# Patient Record
Sex: FEMALE | Race: WHITE | NOT HISPANIC OR LATINO | Employment: FULL TIME | URBAN - METROPOLITAN AREA
[De-identification: names, ages, dates, MRNs, and addresses within clinical notes are randomized per-mention and may not be internally consistent; named-entity substitution may affect disease eponyms.]

---

## 2021-01-28 ENCOUNTER — HOSPITAL ENCOUNTER (EMERGENCY)
Facility: HOSPITAL | Age: 51
Discharge: HOME | End: 2021-01-28
Attending: EMERGENCY MEDICINE | Admitting: EMERGENCY MEDICINE

## 2021-01-28 ENCOUNTER — APPOINTMENT (OUTPATIENT)
Dept: MRI IMAGING | Facility: HOSPITAL | Age: 51
End: 2021-01-28

## 2021-01-28 VITALS
OXYGEN SATURATION: 96 % | WEIGHT: 165 LBS | BODY MASS INDEX: 25.9 KG/M2 | HEART RATE: 97 BPM | DIASTOLIC BLOOD PRESSURE: 79 MMHG | SYSTOLIC BLOOD PRESSURE: 133 MMHG | RESPIRATION RATE: 16 BRPM | TEMPERATURE: 97 F | HEIGHT: 67 IN

## 2021-01-28 DIAGNOSIS — M54.16 LUMBAR RADICULOPATHY, ACUTE: ICD-10-CM

## 2021-01-28 DIAGNOSIS — M51.36 DDD (DEGENERATIVE DISC DISEASE), LUMBAR: Primary | ICD-10-CM

## 2021-01-28 LAB
B-HCG UR QL: NEGATIVE
BILIRUB UR QL STRIP: NEGATIVE
CLARITY UR: CLEAR
COLOR UR: YELLOW
GLUCOSE UR STRIP-MCNC: NEGATIVE MG/DL
HGB UR QL STRIP.AUTO: NEGATIVE
INTERNAL NEGATIVE CONTROL: NEGATIVE
INTERNAL POSITIVE CONTROL: POSITIVE
KETONES UR QL STRIP: ABNORMAL
LEUKOCYTE ESTERASE UR QL STRIP.AUTO: NEGATIVE
Lab: NORMAL
NITRITE UR QL STRIP: NEGATIVE
PH UR STRIP.AUTO: <=5 [PH] (ref 5–8)
PROT UR QL STRIP: NEGATIVE
SP GR UR STRIP: 1.02 (ref 1–1.03)
UROBILINOGEN UR QL STRIP: ABNORMAL

## 2021-01-28 PROCEDURE — 63710000001 PREDNISONE PER 1 MG: Performed by: EMERGENCY MEDICINE

## 2021-01-28 PROCEDURE — 99283 EMERGENCY DEPT VISIT LOW MDM: CPT

## 2021-01-28 PROCEDURE — 81025 URINE PREGNANCY TEST: CPT | Performed by: EMERGENCY MEDICINE

## 2021-01-28 PROCEDURE — 72148 MRI LUMBAR SPINE W/O DYE: CPT

## 2021-01-28 PROCEDURE — 81003 URINALYSIS AUTO W/O SCOPE: CPT | Performed by: EMERGENCY MEDICINE

## 2021-01-28 RX ORDER — NAPROXEN 500 MG/1
500 TABLET ORAL 2 TIMES DAILY PRN
Qty: 12 TABLET | Refills: 0 | OUTPATIENT
Start: 2021-01-28 | End: 2021-04-16

## 2021-01-28 RX ORDER — CYCLOBENZAPRINE HCL 10 MG
10 TABLET ORAL 3 TIMES DAILY PRN
Qty: 12 TABLET | Refills: 0 | OUTPATIENT
Start: 2021-01-28 | End: 2021-04-16

## 2021-01-28 RX ORDER — HYDROCODONE BITARTRATE AND ACETAMINOPHEN 5; 325 MG/1; MG/1
1-2 TABLET ORAL EVERY 6 HOURS PRN
Qty: 15 TABLET | Refills: 0 | OUTPATIENT
Start: 2021-01-28 | End: 2021-04-16

## 2021-01-28 RX ORDER — PREDNISONE 20 MG/1
60 TABLET ORAL ONCE
Status: COMPLETED | OUTPATIENT
Start: 2021-01-28 | End: 2021-01-28

## 2021-01-28 RX ORDER — NAPROXEN 250 MG/1
500 TABLET ORAL ONCE
Status: COMPLETED | OUTPATIENT
Start: 2021-01-28 | End: 2021-01-28

## 2021-01-28 RX ORDER — PREDNISONE 50 MG/1
50 TABLET ORAL DAILY
Qty: 5 TABLET | Refills: 0 | OUTPATIENT
Start: 2021-01-28 | End: 2021-04-16

## 2021-01-28 RX ADMIN — NAPROXEN 500 MG: 250 TABLET ORAL at 07:56

## 2021-01-28 RX ADMIN — PREDNISONE 60 MG: 20 TABLET ORAL at 07:56

## 2021-04-15 PROCEDURE — 99283 EMERGENCY DEPT VISIT LOW MDM: CPT

## 2021-04-16 ENCOUNTER — HOSPITAL ENCOUNTER (EMERGENCY)
Facility: HOSPITAL | Age: 51
Discharge: HOME | End: 2021-04-16
Attending: EMERGENCY MEDICINE | Admitting: EMERGENCY MEDICINE

## 2021-04-16 VITALS
HEART RATE: 87 BPM | BODY MASS INDEX: 26.37 KG/M2 | TEMPERATURE: 97.8 F | WEIGHT: 168 LBS | OXYGEN SATURATION: 98 % | HEIGHT: 67 IN | DIASTOLIC BLOOD PRESSURE: 54 MMHG | RESPIRATION RATE: 18 BRPM | SYSTOLIC BLOOD PRESSURE: 109 MMHG

## 2021-04-16 DIAGNOSIS — M54.31 SCIATICA OF RIGHT SIDE: Primary | ICD-10-CM

## 2021-04-16 PROCEDURE — 96372 THER/PROPH/DIAG INJ SC/IM: CPT

## 2021-04-16 PROCEDURE — 25010000002 KETOROLAC TROMETHAMINE PER 15 MG: Performed by: EMERGENCY MEDICINE

## 2021-04-16 RX ORDER — DIAZEPAM 5 MG/1
10 TABLET ORAL ONCE
Status: COMPLETED | OUTPATIENT
Start: 2021-04-16 | End: 2021-04-16

## 2021-04-16 RX ORDER — CYCLOBENZAPRINE HCL 10 MG
10 TABLET ORAL 3 TIMES DAILY PRN
Qty: 15 TABLET | Refills: 0 | Status: SHIPPED | OUTPATIENT
Start: 2021-04-16 | End: 2021-06-09

## 2021-04-16 RX ORDER — FLUOXETINE HYDROCHLORIDE 20 MG/1
20 CAPSULE ORAL DAILY
COMMUNITY

## 2021-04-16 RX ORDER — METHYLPREDNISOLONE 4 MG/1
TABLET ORAL
Qty: 21 TABLET | Refills: 0 | Status: SHIPPED | OUTPATIENT
Start: 2021-04-16 | End: 2021-06-09 | Stop reason: SDUPTHER

## 2021-04-16 RX ORDER — VENLAFAXINE HYDROCHLORIDE 75 MG/1
75 CAPSULE, EXTENDED RELEASE ORAL 2 TIMES DAILY
COMMUNITY

## 2021-04-16 RX ORDER — HYDROCODONE BITARTRATE AND ACETAMINOPHEN 5; 325 MG/1; MG/1
1 TABLET ORAL ONCE
Status: COMPLETED | OUTPATIENT
Start: 2021-04-16 | End: 2021-04-16

## 2021-04-16 RX ORDER — NAPROXEN 500 MG/1
500 TABLET ORAL 2 TIMES DAILY PRN
Qty: 10 TABLET | Refills: 0 | Status: SHIPPED | OUTPATIENT
Start: 2021-04-16 | End: 2021-06-09

## 2021-04-16 RX ORDER — CLONAZEPAM 0.5 MG/1
0.5 TABLET ORAL 2 TIMES DAILY PRN
COMMUNITY

## 2021-04-16 RX ORDER — HYDROCODONE BITARTRATE AND ACETAMINOPHEN 5; 325 MG/1; MG/1
1 TABLET ORAL EVERY 6 HOURS PRN
Qty: 12 TABLET | Refills: 0 | Status: SHIPPED | OUTPATIENT
Start: 2021-04-16 | End: 2021-06-09

## 2021-04-16 RX ORDER — KETOROLAC TROMETHAMINE 30 MG/ML
30 INJECTION, SOLUTION INTRAMUSCULAR; INTRAVENOUS ONCE
Status: COMPLETED | OUTPATIENT
Start: 2021-04-16 | End: 2021-04-16

## 2021-04-16 RX ADMIN — HYDROCODONE BITARTRATE AND ACETAMINOPHEN 1 TABLET: 5; 325 TABLET ORAL at 00:57

## 2021-04-16 RX ADMIN — DIAZEPAM 10 MG: 5 TABLET ORAL at 00:57

## 2021-04-16 RX ADMIN — KETOROLAC TROMETHAMINE 30 MG: 30 INJECTION, SOLUTION INTRAMUSCULAR; INTRAVENOUS at 01:00

## 2021-04-16 NOTE — ED PROVIDER NOTES
Subjective   Nati Phelps is a 50 yr old female presents the emergency department for complaints of low back pain.  Patient reports that she had an MRI done on January 28.  She advised that she had a herniated, bulging disc.  Patient reports that she has not followed up regarding these imaging results.  She advises that she has moved some boxes recently and has really aggravated her back.  She denies any loss of bowel or bladder function.  Negative for any saddle anesthesia.  Patient reports that the pains off the charts at this time.      History provided by:  Patient   used: No    Back Pain  Location:  Lumbar spine  Quality:  Stabbing and burning  Radiates to:  R posterior upper leg  Pain severity:  Moderate  Relieved by:  Nothing  Worsened by:  Bending, twisting, palpation, sitting and movement  Associated symptoms: no bladder incontinence, no bowel incontinence, no chest pain, no fever, no numbness, no paresthesias, no tingling and no weakness        Review of Systems   Constitutional: Negative for chills and fever.   Respiratory: Negative for cough and shortness of breath.    Cardiovascular: Negative for chest pain.   Gastrointestinal: Negative for bowel incontinence, nausea and vomiting.   Genitourinary: Negative for bladder incontinence.   Musculoskeletal: Positive for back pain.   Neurological: Negative for tingling, weakness, numbness and paresthesias.   All other systems reviewed and are negative.      Past Medical History:   Diagnosis Date   • Injury of back        Allergies   Allergen Reactions   • Sulfa Antibiotics Swelling       Past Surgical History:   Procedure Laterality Date   • BREAST SURGERY      reduction   • HYSTERECTOMY      partial       History reviewed. No pertinent family history.    Social History     Socioeconomic History   • Marital status: Single     Spouse name: Not on file   • Number of children: Not on file   • Years of education: Not on file   • Highest  education level: Not on file   Tobacco Use   • Smoking status: Never Smoker   • Smokeless tobacco: Never Used   Substance and Sexual Activity   • Alcohol use: Never   • Drug use: Yes     Types: Marijuana   • Sexual activity: Not Currently           Objective   Physical Exam  Vitals and nursing note reviewed.   Constitutional:       Appearance: Normal appearance. She is well-developed. She is not toxic-appearing.      Comments: Patient appears uncomfortable with movements.   HENT:      Head: Normocephalic and atraumatic.      Nose: Nose normal.      Mouth/Throat:      Mouth: Mucous membranes are moist.   Eyes:      General: Lids are normal.      Extraocular Movements: Extraocular movements intact.      Conjunctiva/sclera: Conjunctivae normal.      Pupils: Pupils are equal, round, and reactive to light.   Neck:      Trachea: Trachea normal.   Cardiovascular:      Rate and Rhythm: Regular rhythm.      Pulses: Normal pulses.      Heart sounds: Normal heart sounds.   Pulmonary:      Effort: Pulmonary effort is normal. No respiratory distress.      Breath sounds: Normal breath sounds. No decreased breath sounds, wheezing, rhonchi or rales.   Abdominal:      General: Bowel sounds are normal.      Palpations: Abdomen is soft.      Tenderness: There is no abdominal tenderness.   Musculoskeletal:      Cervical back: Normal, full passive range of motion without pain and normal range of motion.      Thoracic back: Normal.      Lumbar back: Tenderness (paraspinal tenderness, Rt > LT, RT SI joint tenderness) present. No swelling or edema. Normal range of motion.   Skin:     General: Skin is warm and dry.      Findings: No rash.   Neurological:      Mental Status: She is alert and oriented to person, place, and time.      Cranial Nerves: No cranial nerve deficit.   Psychiatric:         Speech: Speech normal.         Behavior: Behavior normal. Behavior is cooperative.         Procedures           ED Course  ED Course as of Apr 16  "0207   Fri Apr 16, 2021 0207 The patient will be discharged home.  Patient to follow-up with PCP, neurosurgery.  Patient to take meds as ordered.  Patient to return to the ED as needed.  Patient agrees with treatment plan and verbalized understanding.    [KG]      ED Course User Index  [KG] Betsy Douglass C, APRN           No results found for this or any previous visit (from the past 24 hour(s)).  Note: In addition to lab results from this visit, the labs listed above may include labs taken at another facility or during a different encounter within the last 24 hours. Please correlate lab times with ED admission and discharge times for further clarification of the services performed during this visit.    No orders to display     Vitals:    04/15/21 2338   BP: 140/86   BP Location: Right arm   Patient Position: Sitting   Pulse: 91   Resp: 18   Temp: 97.8 °F (36.6 °C)   TempSrc: Oral   SpO2: 98%   Weight: 76.2 kg (168 lb)   Height: 170.2 cm (67\")     Medications   ketorolac (TORADOL) injection 30 mg (30 mg Intramuscular Given 4/16/21 0100)   diazePAM (VALIUM) tablet 10 mg (10 mg Oral Given 4/16/21 0057)   HYDROcodone-acetaminophen (NORCO) 5-325 MG per tablet 1 tablet (1 tablet Oral Given 4/16/21 0057)     ECG/EMG Results (last 24 hours)     ** No results found for the last 24 hours. **        No orders to display                                       MDM    Final diagnoses:   Sciatica of right side       ED Disposition  ED Disposition     ED Disposition Condition Comment    Discharge Stable           Fernando Davis PA-C  [address] [place], [place] [address] [phone]               Medication List      New Prescriptions    methylPREDNISolone 4 MG dose pack  Commonly known as: MEDROL  Take as directed on package instructions.        Changed    HYDROcodone-acetaminophen 5-325 MG per tablet  Commonly known as: NORCO  Take 1 tablet by mouth Every 6 (Six) Hours As Needed for Moderate Pain .  What " changed:   · how much to take  · reasons to take this        Stop    predniSONE 50 MG tablet  Commonly known as: DELTASONE           Where to Get Your Medications      These medications were sent to [facility]/pharmacy #[id number] - [place], [place] - [address] [phone]  - [phone]   [address] [address]    Hours: 24-hours Phone: [phone]   · cyclobenzaprine 10 MG tablet  · methylPREDNISolone 4 MG dose pack  · naproxen 500 MG EC tablet     You can get these medications from any pharmacy    Bring a paper prescription for each of these medications  · HYDROcodone-acetaminophen 5-325 MG per tablet          Betsy Douglass, APRN  04/16/21 0207

## 2021-04-16 NOTE — DISCHARGE INSTRUCTIONS
Follow up with one of the [facility] Primary Care Providers below to setup primary care. If you need assistance coordinating a primary care appointment with a [facility] Primary Care Provider, please contact the Primary Care Coordinators at [phone] for appointment scheduling.    [facility], Primary Care   [address] , [address]   [place], [place] [address] [phone]    [facility] Internal Medicine & Endocrinology  [address], [address]  [place] [address] [phone]    [facility] Family [facility]  [address], [address]   [place] [address] [phone]    [facility]  [address], [address]  [place], [place] [address] [phone]    [facility], Primary Care,   [address], [address]   [place], [place] [address] [phone]    [facility]  [address]  [place], [place] [address] [phone]    [facility], Primary Care  [address], [address]  [place], [place] [address] [phone]    [facility] Internal Medicine & Pediatrics  [address], [address]   [place] [address] [phone]    [facility], Primary Care  [address] [place] [address] [phone]      [facility]  [address], [address]   [place] [address] [phone]    [facility]  [address] [place] [address] [phone]

## 2021-06-09 ENCOUNTER — OFFICE VISIT (OUTPATIENT)
Dept: FAMILY MEDICINE CLINIC | Facility: CLINIC | Age: 51
End: 2021-06-09

## 2021-06-09 VITALS
OXYGEN SATURATION: 99 % | HEART RATE: 76 BPM | DIASTOLIC BLOOD PRESSURE: 70 MMHG | HEIGHT: 67 IN | TEMPERATURE: 98.2 F | SYSTOLIC BLOOD PRESSURE: 110 MMHG | WEIGHT: 170 LBS | BODY MASS INDEX: 26.68 KG/M2

## 2021-06-09 DIAGNOSIS — M54.42 CHRONIC BILATERAL LOW BACK PAIN WITH BILATERAL SCIATICA: Primary | ICD-10-CM

## 2021-06-09 DIAGNOSIS — G89.29 CHRONIC BILATERAL LOW BACK PAIN WITH BILATERAL SCIATICA: Primary | ICD-10-CM

## 2021-06-09 DIAGNOSIS — Z85.41 HISTORY OF CERVICAL CANCER: ICD-10-CM

## 2021-06-09 DIAGNOSIS — M51.36 DDD (DEGENERATIVE DISC DISEASE), LUMBAR: ICD-10-CM

## 2021-06-09 DIAGNOSIS — M54.41 CHRONIC BILATERAL LOW BACK PAIN WITH BILATERAL SCIATICA: Primary | ICD-10-CM

## 2021-06-09 PROCEDURE — 99204 OFFICE O/P NEW MOD 45 MIN: CPT | Performed by: PHYSICIAN ASSISTANT

## 2021-06-09 RX ORDER — CYCLOBENZAPRINE HCL 10 MG
10 TABLET ORAL 3 TIMES DAILY PRN
Qty: 30 TABLET | Refills: 1 | Status: SHIPPED | OUTPATIENT
Start: 2021-06-09

## 2021-06-09 RX ORDER — METHYLPREDNISOLONE 4 MG/1
TABLET ORAL
Qty: 21 TABLET | Refills: 0 | Status: SHIPPED | OUTPATIENT
Start: 2021-06-09 | End: 2021-07-19
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
## 2021-06-09 NOTE — PATIENT INSTRUCTIONS
-Take Tylenol 2 extra strength tablets twice daily  -Call your psychiatrist to see if he would be willing to change effexor to cymbalta to help with pain    Degenerative Disk Disease    Degenerative disk disease is a condition caused by changes that occur in the spinal disks as a person ages. Spinal disks are soft and compressible disks located between the bones of your spine (vertebrae). These disks act like shock absorbers.  Degenerative disk disease can affect the whole spine. However, the neck and lower back are most often affected. Many changes can occur in the spinal disks with aging, such as:  · The spinal disks may dry and shrink.  · Small tears may occur in the tough, outer covering of the disk (annulus).  · The disk space may become smaller due to loss of water.  · Abnormal growths in the bone (spurs) may occur. This can put pressure on the nerve roots exiting the spinal canal, causing pain.  · The spinal canal may become narrowed.  What are the causes?  This condition may be caused by:  · Normal degeneration with age.  · Injuries.  · Certain activities and sports that cause damage.  What increases the risk?  The following factors may make you more likely to develop this condition:  · Being overweight.  · Having a family history of degenerative disk disease.  · Smoking.  · Sudden injury.  · Doing work that requires heavy lifting.  What are the signs or symptoms?  Symptoms of this condition include:  · Pain that varies in intensity. Some people have no pain, while others have severe pain. The location of the pain depends on the part of your backbone that is affected. You may have:  ? Pain in your neck or arm if a disk in your neck area is affected.  ? Pain in your back, buttocks, or legs if a disk in your lower back is affected.  · Pain that becomes worse while bending or reaching up, or with twisting movements.  · Pain that may start gradually and then get worse as time passes. It may also start after a  major or minor injury.  · Numbness or tingling in the arms or legs.  How is this diagnosed?  This condition may be diagnosed based on:  · Your symptoms and medical history.  · A physical exam.  · Imaging tests, including:  ? An X-ray of the spine.  ? MRI.  How is this treated?  This condition may be treated with:  · Medicines.  · Rehabilitation exercises. These activities aim to strengthen muscles in your back and abdomen to better support your spine.  If treatments do not help to relieve your symptoms or you have severe pain, you may need surgery.  Follow these instructions at home:  Medicines  · Take over-the-counter and prescription medicines only as told by your health care provider.  · Do not drive or use heavy machinery while taking prescription pain medicine.  · If you are taking prescription pain medicine, take actions to prevent or treat constipation. Your health care provider may recommend that you:  ? Drink enough fluid to keep your urine pale yellow.  ? Eat foods that are high in fiber, such as fresh fruits and vegetables, whole grains, and beans.  ? Limit foods that are high in fat and processed sugars, such as fried or sweet foods.  ? Take an over-the-counter or prescription medicine for constipation.  Activity  · Rest as told by your health care provider.  · Ask your health care provider what activities are safe for you. Return to your normal activities as directed.  · Avoid sitting for a long time without moving. Get up to take short walks every 1-2 hours. This is important to improve blood flow and breathing. Ask for help if you feel weak or unsteady.  · Perform relaxation exercises as told by your health care provider.  · Maintain good posture.  · Do not lift anything that is heavier than 10 lb (4.5 kg), or the limit that you are told, until your health care provider says that it is safe.  · Follow proper lifting and walking techniques as told by your health care provider.  Managing pain,  stiffness, and swelling    · If directed, put ice on the painful area. Icing can help to relieve pain.  ? Put ice in a plastic bag.  ? Place a towel between your skin and the bag.  ? Leave the ice on for 20 minutes, 2-3 times a day.  · If directed, apply heat to the painful area as often as told by your health care provider. Heat can reduce the stiffness of your muscles. Use the heat source that your health care provider recommends, such as a moist heat pack or a heating pad.  ? Place a towel between your skin and the heat source.  ? Leave the heat on for 20-30 minutes.  ? Remove the heat if your skin turns bright red. This is especially important if you are unable to feel pain, heat, or cold. You may have a greater risk of getting burned.  General instructions  · Change your sitting, standing, and sleeping habits as told by your health care provider.  · Avoid sitting in the same position for long periods of time. Change positions frequently.  · Lose weight or maintain a healthy weight as told by your health care provider.  · Do not use any products that contain nicotine or tobacco, such as cigarettes and e-cigarettes. If you need help quitting, ask your health care provider.  · Wear supportive footwear.  · Keep all follow-up visits as told by your health care provider. This is important. This may include visits for physical therapy.  Contact a health care provider if you:  · Have pain that does not go away within 1-4 weeks.  · Lose your appetite.  · Lose weight without trying.  Get help right away if you:  · Have severe pain.  · Notice weakness in your arms, hands, or legs.  · Begin to lose control of your bladder or bowel movements.  · Have fevers or night sweats.  Summary  · Degenerative disk disease is a condition caused by changes that occur in the spinal disks as a person ages.  · Degenerative disk disease can affect the whole spine. However, the neck and lower back are most often affected.  · Take  over-the-counter and prescription medicines only as told by your health care provider.  This information is not intended to replace advice given to you by your health care provider. Make sure you discuss any questions you have with your health care provider.  Document Revised: 12/13/2018 Document Reviewed: 12/13/2018  [employer] Patient Education © 2021 [employer]    Managing Pain Without Opioids  Opioids are strong medicines used to treat moderate to severe pain. For some people, especially those who have long-term (chronic) pain, opioids may not be the best choice for pain management due to:  · Side effects like nausea, constipation, and sleepiness.  · The risk of addiction (opioid use disorder). The longer you take opioids, the greater your risk of addiction.  Pain that lasts for more than 3 months is called chronic pain. Managing chronic pain usually requires more than one approach and is often provided by a team of health care providers working together (multidisciplinary approach). Pain management may be done at a pain management center or pain clinic.  Types of pain management without opioids  Managing pain without opioids can involve:  · Non-opioid medicines.  · Exercises to help relieve pain and improve strength and range of motion (physical therapy).  · Therapy to help with everyday tasks and activities (occupational therapy).  · Therapy to help you find ways to relieve pain by doing things you enjoy (recreational therapy).  · Talk therapy (psychotherapy) and other mental health therapies.  · Medical treatments such as injections or devices.  · Making lifestyle changes.  Pain management options  Non-opioid medicines  Non-opioid medicines for pain may include medicines taken by mouth (oral medicines), such as:  · Over-the-counter or prescription NSAIDs. These may be the first medicines used for pain. They work well for muscle and bone pain, and they reduce swelling.  · Acetaminophen. This over-the-counter  medicine may work well for milder pain but not swelling.  · Antidepressants. These may be used to treat chronic pain. A certain type of antidepressant (tricyclics) is often used. These medicines are given in lower doses for pain than when used for depression.  · Anticonvulsants. These are usually used to treat seizures but may also reduce nerve (neuropathic) pain.  · Muscle relaxants. These relieve pain caused by sudden muscle tightening (spasms).  You may also use a type of pain medicine that is applied to the skin as a patch, cream, or gel (topical analgesic), such as a numbing medicine. These may cause fewer side effects than oral medicines.  Therapy  Physical therapy involves doing exercises to gain strength and flexibility. A physical therapist may teach you exercises to move and stretch parts of your body that are weak, stiff, or painful. You can learn these exercises at physical therapy visits and practice them at home. Physical therapy may also involve:  · Massage.  · Heat wraps or applying heat or cold to affected areas.  · Sending electrical signals through the skin to interrupt pain signals (transcutaneous electrical nerve stimulation, TENS).  · Sending weak lasers through the skin to reduce pain and swelling (low-level laser therapy).  · Using signals from your body to help you learn to regulate pain (biofeedback).  Occupational therapy helps you learn ways to function at home and work with less pain.  Recreational therapy may involve trying new activities or hobbies, such as drawing or a physical activity.  Types of mental health therapy for pain include:  · Cognitive behavioral therapy (CBT) to help you learn coping skills for dealing with pain.  · Acceptance and commitment therapy (ACT) to change the way you think and react to pain.  · Relaxation therapies, including muscle relaxation exercises and focusing your mind on the present moment to lower stress (mindfulness-based stress reduction).  · Pain  management counseling. This may be individual, family, or group counseling.    Medical treatments  Medical treatments for pain management include:  · Nerve block injections. These may include a pain blocker and anti-inflammatory medicines. You may have injections:  ? Near the spine to relieve chronic back or neck pain.  ? Into joints to relieve back or joint pain.  ? Into nerve areas that supply a painful area to relieve body pain.  ? Into muscles (trigger point injections) to relieve some painful muscle conditions.  · A medical device placed near your spine to help block pain signals and relieve nerve pain or chronic back pain (spinal cord stimulation device).  · Acupuncture.  Follow these instructions at home  Medicines  · Take over-the-counter and prescription medicines only as told by your health care provider.  · If you are taking pain medicine, ask your health care providers about possible side effects to watch out for.  · Do not drive or use heavy machinery while taking prescription pain medicine.  Lifestyle    · Do not use drugs or alcohol to reduce pain. Limit alcohol intake to no more than 1 drink a day for nonpregnant women and 2 drinks a day for men. One drink equals 12 oz of beer, 5 oz of wine, or 1½ oz of hard liquor.  · Do not use any products that contain nicotine or tobacco, such as cigarettes and e-cigarettes. These can delay healing. If you need help quitting, ask your health care provider.  · Eat a healthy diet and maintain a healthy weight. Poor diet and excess weight may make pain worse.  ? Eat foods that are high in fiber. These include fresh fruits and vegetables, whole grains, and beans.  ? Limit foods that are high in fat and processed sugars, such as fried and sweet foods.  · Exercise regularly. Exercise lowers stress and may help relieve pain.  ? Ask your health care provider what activities and exercises are safe for you.  ? If your health care provider approves, join an exercise class  that combines movement and stress reduction. Examples include yoga and [place].  · Get enough sleep. Lack of sleep may make pain worse.  · Lower stress as much as possible. Practice stress reduction techniques as told by your therapist.  General instructions  · Work with all your pain management providers to find the treatments that work best for you. You are an important member of your pain management team. There are many things you can do to reduce pain on your own.  · Consider joining an online or in-person support group for people who have chronic pain.  · Keep all follow-up visits as told by your health care providers. This is important.  Where to find more information  You can find more information about managing pain without opioids from:  · American Academy of Pain Medicine: painmed.org  · [place] [facility]: instituteforchronicpain.org  · American Chronic Pain Association: theacpa.org  Contact a health care provider if:  · You have side effects from pain medicine.  · Your pain gets worse or does not get better with treatments or home care.  · You are struggling with anxiety or depression.  Summary  · Many types of pain can be managed without opioids. Chronic pain may respond better to pain management without opioids.  · Pain is best managed with a team of providers working together.  · Pain management without opioids may include non-opioid medicines, medical treatments, physical therapy, mental health therapy, and lifestyle changes.  · Tell your health care providers if your pain gets worse or is not being managed well enough.  This information is not intended to replace advice given to you by your health care provider. Make sure you discuss any questions you have with your health care provider.  Document Revised: 09/09/2020 Document Reviewed: 10/09/2018  [employer] Patient Education © 2021 [employer]
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
## 2021-07-05 PROBLEM — F41.9 ANXIETY AND DEPRESSION: Status: ACTIVE | Noted: 2021-07-05

## 2021-07-05 PROBLEM — F32.A ANXIETY AND DEPRESSION: Status: ACTIVE | Noted: 2021-07-05

## 2021-07-05 PROBLEM — M51.37 DEGENERATION OF LUMBAR OR LUMBOSACRAL INTERVERTEBRAL DISC: Status: ACTIVE | Noted: 2021-07-05

## 2021-07-05 PROBLEM — M47.816 SPONDYLOSIS OF LUMBAR REGION WITHOUT MYELOPATHY OR RADICULOPATHY: Status: ACTIVE | Noted: 2021-07-05

## 2021-07-05 PROBLEM — M48.062 LUMBAR STENOSIS WITH NEUROGENIC CLAUDICATION: Status: ACTIVE | Noted: 2021-07-05

## 2021-07-08 ENCOUNTER — OFFICE VISIT (OUTPATIENT)
Dept: PAIN MEDICINE | Facility: CLINIC | Age: 51
End: 2021-07-08

## 2021-07-08 VITALS
HEIGHT: 67 IN | BODY MASS INDEX: 26.97 KG/M2 | WEIGHT: 171.8 LBS | SYSTOLIC BLOOD PRESSURE: 110 MMHG | OXYGEN SATURATION: 98 % | TEMPERATURE: 96.2 F | DIASTOLIC BLOOD PRESSURE: 62 MMHG | HEART RATE: 86 BPM

## 2021-07-08 DIAGNOSIS — M47.816 SPONDYLOSIS OF LUMBAR REGION WITHOUT MYELOPATHY OR RADICULOPATHY: ICD-10-CM

## 2021-07-08 DIAGNOSIS — F41.9 ANXIETY AND DEPRESSION: ICD-10-CM

## 2021-07-08 DIAGNOSIS — M54.16 LUMBAR RADICULITIS: ICD-10-CM

## 2021-07-08 DIAGNOSIS — M48.26 LUMBAR INTERSPINOUS BURSITIS: ICD-10-CM

## 2021-07-08 DIAGNOSIS — F32.A ANXIETY AND DEPRESSION: ICD-10-CM

## 2021-07-08 DIAGNOSIS — M54.16 LUMBAR RADICULITIS: Primary | ICD-10-CM

## 2021-07-08 DIAGNOSIS — M51.26 LUMBAR DISCOGENIC PAIN SYNDROME: ICD-10-CM

## 2021-07-08 DIAGNOSIS — M51.37 DEGENERATION OF LUMBAR OR LUMBOSACRAL INTERVERTEBRAL DISC: ICD-10-CM

## 2021-07-08 DIAGNOSIS — M48.061 BILATERAL STENOSIS OF LATERAL RECESS OF LUMBAR SPINE: ICD-10-CM

## 2021-07-08 PROCEDURE — 99204 OFFICE O/P NEW MOD 45 MIN: CPT | Performed by: ANESTHESIOLOGY

## 2021-07-08 RX ORDER — GABAPENTIN 100 MG/1
CAPSULE ORAL
Qty: 90 CAPSULE | Refills: 1 | Status: SHIPPED | OUTPATIENT
Start: 2021-07-08 | End: 2021-08-31 | Stop reason: SDUPTHER

## 2021-07-08 RX ORDER — MULTIVITAMIN WITH IRON
100 TABLET ORAL DAILY
Qty: 30 TABLET | Refills: 0 | Status: SHIPPED | OUTPATIENT
Start: 2021-07-08

## 2021-07-08 RX ORDER — ME-TETRAHYDROFOLATE/B12/HRB236 1-1-500 MG
1 CAPSULE ORAL DAILY
Qty: 90 CAPSULE | Refills: 1 | Status: SHIPPED | OUTPATIENT
Start: 2021-07-08

## 2021-07-19 ENCOUNTER — OFFICE VISIT (OUTPATIENT)
Dept: OBSTETRICS AND GYNECOLOGY | Facility: CLINIC | Age: 51
End: 2021-07-19

## 2021-07-19 ENCOUNTER — HOSPITAL ENCOUNTER (OUTPATIENT)
Dept: GENERAL RADIOLOGY | Facility: HOSPITAL | Age: 51
Discharge: HOME | End: 2021-07-19
Admitting: ANESTHESIOLOGY

## 2021-07-19 VITALS
WEIGHT: 169.4 LBS | BODY MASS INDEX: 26.59 KG/M2 | DIASTOLIC BLOOD PRESSURE: 70 MMHG | RESPIRATION RATE: 14 BRPM | HEIGHT: 67 IN | SYSTOLIC BLOOD PRESSURE: 108 MMHG

## 2021-07-19 DIAGNOSIS — Z11.3 SCREEN FOR STD (SEXUALLY TRANSMITTED DISEASE): ICD-10-CM

## 2021-07-19 DIAGNOSIS — Z90.710 STATUS POST HYSTERECTOMY: ICD-10-CM

## 2021-07-19 DIAGNOSIS — R10.30 LOWER ABDOMINAL PAIN: Primary | ICD-10-CM

## 2021-07-19 DIAGNOSIS — Z87.42 HISTORY OF ABNORMAL CERVICAL PAP SMEAR: ICD-10-CM

## 2021-07-19 DIAGNOSIS — Z12.31 VISIT FOR SCREENING MAMMOGRAM: ICD-10-CM

## 2021-07-19 DIAGNOSIS — M47.816 SPONDYLOSIS OF LUMBAR REGION WITHOUT MYELOPATHY OR RADICULOPATHY: ICD-10-CM

## 2021-07-19 PROCEDURE — 99386 PREV VISIT NEW AGE 40-64: CPT | Performed by: OBSTETRICS & GYNECOLOGY

## 2021-07-19 PROCEDURE — 72120 X-RAY BEND ONLY L-S SPINE: CPT

## 2021-07-19 RX ORDER — ACETAMINOPHEN 500 MG
500 TABLET ORAL EVERY 6 HOURS PRN
COMMUNITY

## 2021-07-19 RX ORDER — NAPROXEN SODIUM 220 MG
220 TABLET ORAL 2 TIMES DAILY PRN
COMMUNITY

## 2021-07-19 NOTE — PROGRESS NOTES
Subjective   Chief Complaint   Patient presents with   • Establish Care     Patient is here today for bloating, sciatic nerve pain radiating around into her lower abdomen. Patient states she had cervcical cancer , had a hysterectomy but they left her ovaries.     Nati Phelps is a 50 y.o. year old  who is S/P hysterectomy presenting to be seen for her annual exam.  Patient had a hysterectomy in  for abnormal Pap smear.  She is done well since the surgery.  Patient complains of abdominal bloating which has been present for several years.  She had an ultrasound of her ovaries several years ago which were normal.  Patient's main problem is low back pain due to herniated disc.  Patient is under treatment for this problem.  She is having no other GYN symptoms.  Patient broke off a relationship due to infidelity and would like to have STD screening.  Adult Visits - 40 to 64 Years - Counseling/Anticipatory Guidance: Nutrition, physical activity, healthy weight, injury prevention, misuse of tobacco, alcohol and drugs, sexual behavior and STDs, contraception, dental health, mental health, immunizations, screenings for women: Breast cancer and self breast exams.     SEXUAL Hx:  She is not currently sexually active.  In the past year there has not been new sexual partners.    Condoms are not typically used.  She would like to be screened for STD's at today's exam.  HEALTH Hx:  She exercises regularly: yes.  She wears her seat belt:yes.  She has concerns about domestic violence: no.  OTHER COMPLAINTS:  Nothing else    The following portions of the patient's history were reviewed and updated as appropriate:problem list, current medications, allergies, past family history, past medical history, past social history and past surgical history.    Social History    Tobacco Use      Smoking status: Never Smoker      Smokeless tobacco: Never Used    Review of Systems  Review of Systems - History obtained from the  "patient  General ROS: negative  Psychological ROS: positive for - anxiety and depression  ENT ROS: negative  Allergy and Immunology ROS: positive for - seasonal allergies  Hematological and Lymphatic ROS: positive for - bruising  Endocrine ROS: negative  Breast ROS: negative for breast lumps  Bilateral breast reduction, patient needs to schedule screening mammogram  Respiratory ROS: no cough, shortness of breath, or wheezing  Cardiovascular ROS: no chest pain or dyspnea on exertion  Gastrointestinal ROS: no abdominal pain, change in bowel habits, or black or bloody stools  Genito-Urinary ROS: no dysuria, trouble voiding, or hematuria  Musculoskeletal ROS: positive for - pain in back - lower  Neurological ROS: no TIA or stroke symptoms  Dermatological ROS: negative        Objective   /70 (BP Location: Right arm, Patient Position: Sitting, Cuff Size: Adult)   Resp 14   Ht 170.2 cm (67\")   Wt 76.8 kg (169 lb 6.4 oz)   LMP  (LMP Unknown)   Breastfeeding No   BMI 26.53 kg/m²     General:  well developed; well nourished  no acute distress   Skin:  No suspicious lesions seen   Thyroid: not examined   Breasts:  Examined in supine position  Symmetric without masses or skin dimpling  Nipples normal without inversion, lesions or discharge  There are no palpable axillary nodes  Bilateral breast reduction scars are noted   Abdomen: soft, non-tender; no masses  no umbilical or inguinal hernias are present  no hepato-splenomegaly   Heart: regular rate and rhythm, S1, S2 normal, no murmur, click, rub or gallop   Lungs: clear to auscultation   Pelvis: Clinical staff was present for exam  External genitalia:  normal appearance of the external genitalia including Bartholin's and [place]'s glands.  :  urethral meatus normal;  Vaginal:  normal pink mucosa without prolapse or lesions. Pap smear and 1 swab was done  Cervix:  absent.  Uterus:  absent.  Adnexa:  non palpable bilaterally.  Rectal:  digital rectal exam not " performed; anus visually normal appearing.          Assessment/Plan   Diagnoses and all orders for this visit:    1. Lower abdominal pain (Primary)  -     Chlamydia trachomatis, Neisseria gonorrhoeae, Trichomonas vaginalis, PCR - Swab, Vagina; Future  -     US Non-ob Transvaginal    2. History of abnormal cervical Pap smear  -     Pap IG, HPV-hr; Future    3. Status post hysterectomy  -     Chlamydia trachomatis, Neisseria gonorrhoeae, Trichomonas vaginalis, PCR - Swab, Vagina; Future  -     US Non-ob Transvaginal    4. Visit for screening mammogram  -     Mammo Screening Digital Tomosynthesis Bilateral With CAD; Future    5. Screen for STD (sexually transmitted disease)  -     Chlamydia trachomatis, Neisseria gonorrhoeae, Trichomonas vaginalis, PCR - Swab, Vagina; Future    Other orders  -     Cancel: Pap IG, Rfx HPV ASCU; Future         Transvaginal ultrasound shows prominent bowel gas.  The ovaries were difficult to find vaginally but abdominally appeared to be of normal size  Return in 1 year    This note was electronically signed.    Tylor Fung MD   July 19, 2021

## 2021-07-23 ENCOUNTER — TELEPHONE (OUTPATIENT)
Dept: OBSTETRICS AND GYNECOLOGY | Facility: CLINIC | Age: 51
End: 2021-07-23

## 2021-07-23 ENCOUNTER — PATIENT ROUNDING (BHMG ONLY) (OUTPATIENT)
Dept: OBSTETRICS AND GYNECOLOGY | Facility: CLINIC | Age: 51
End: 2021-07-23

## 2021-07-23 NOTE — PROGRESS NOTES
July 23, 2021    Hello, may I speak with Nati Phelps?    My name is Suzi.    I am  with [facility]  [address] [address] [phone].    Before we get started may I verify your date of birth? 1970    I am calling to officially welcome you to our practice and ask about your recent visit. Is this a good time to talk? yes    Tell me about your visit with us. What things went well?  Everything went great.  The staff were all pleasant.  Dr. Fung was really nice. I know he is leaving but I will be coming back to see the new physician.        We're always looking for ways to make our patients' experiences even better. Do you have recommendations on ways we may improve?  no    Overall were you satisfied with your first visit to our practice? yes       I appreciate you taking the time to speak with me today. Is there anything else I can do for you? no      Thank you, and have a great day.

## 2021-07-26 ENCOUNTER — TELEPHONE (OUTPATIENT)
Dept: OBSTETRICS AND GYNECOLOGY | Facility: CLINIC | Age: 51
End: 2021-07-26

## 2021-07-26 ENCOUNTER — OUTSIDE FACILITY SERVICE (OUTPATIENT)
Dept: PAIN MEDICINE | Facility: CLINIC | Age: 51
End: 2021-07-26

## 2021-07-26 DIAGNOSIS — Z87.42 HISTORY OF ABNORMAL CERVICAL PAP SMEAR: ICD-10-CM

## 2021-07-26 PROCEDURE — 64484 NJX AA&/STRD TFRM EPI L/S EA: CPT | Performed by: ANESTHESIOLOGY

## 2021-07-26 PROCEDURE — 64483 NJX AA&/STRD TFRM EPI L/S 1: CPT | Performed by: ANESTHESIOLOGY

## 2021-07-26 PROCEDURE — 99152 MOD SED SAME PHYS/QHP 5/>YRS: CPT | Performed by: ANESTHESIOLOGY

## 2021-07-26 NOTE — TELEPHONE ENCOUNTER
Patient was called and informed that the Pap smear was nondiagnostic on the vaginal cuff.  However, the HPV screen was negative.  Patient will repeat the Pap smear in 1 year.    Tylor Fung MD

## 2021-07-27 ENCOUNTER — TELEPHONE (OUTPATIENT)
Dept: PAIN MEDICINE | Facility: CLINIC | Age: 51
End: 2021-07-27

## 2021-07-27 NOTE — TELEPHONE ENCOUNTER
Spoke with pt regarding yesterdays procedure with Dr. Powell. Pt stated they were feeling good, with no complaints. Advised of f/u appointment. Pt. understood

## 2021-07-30 DIAGNOSIS — M54.16 LUMBAR RADICULITIS: ICD-10-CM

## 2021-08-02 RX ORDER — MULTIVITAMIN WITH IRON
TABLET ORAL
Qty: 30 TABLET | Refills: 0 | OUTPATIENT
Start: 2021-08-02

## 2021-08-04 ENCOUNTER — HOSPITAL ENCOUNTER (OUTPATIENT)
Dept: NEUROLOGY | Facility: HOSPITAL | Age: 51
Discharge: HOME | End: 2021-08-04
Admitting: ANESTHESIOLOGY

## 2021-08-04 DIAGNOSIS — M54.16 LUMBAR RADICULITIS: ICD-10-CM

## 2021-08-04 PROCEDURE — 95911 NRV CNDJ TEST 9-10 STUDIES: CPT | Performed by: PSYCHIATRY & NEUROLOGY

## 2021-08-04 PROCEDURE — 95886 MUSC TEST DONE W/N TEST COMP: CPT

## 2021-08-04 PROCEDURE — 95911 NRV CNDJ TEST 9-10 STUDIES: CPT

## 2021-08-04 PROCEDURE — 95886 MUSC TEST DONE W/N TEST COMP: CPT | Performed by: PSYCHIATRY & NEUROLOGY
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
## 2021-08-31 ENCOUNTER — OFFICE VISIT (OUTPATIENT)
Dept: PAIN MEDICINE | Facility: CLINIC | Age: 51
End: 2021-08-31

## 2021-08-31 VITALS
WEIGHT: 168.2 LBS | OXYGEN SATURATION: 97 % | DIASTOLIC BLOOD PRESSURE: 72 MMHG | SYSTOLIC BLOOD PRESSURE: 115 MMHG | TEMPERATURE: 96.2 F | BODY MASS INDEX: 26.34 KG/M2 | HEART RATE: 71 BPM

## 2021-08-31 DIAGNOSIS — M47.816 SPONDYLOSIS OF LUMBAR REGION WITHOUT MYELOPATHY OR RADICULOPATHY: ICD-10-CM

## 2021-08-31 DIAGNOSIS — M54.16 LUMBAR RADICULITIS: ICD-10-CM

## 2021-08-31 DIAGNOSIS — M51.26 LUMBAR DISCOGENIC PAIN SYNDROME: ICD-10-CM

## 2021-08-31 DIAGNOSIS — M48.26 LUMBAR INTERSPINOUS BURSITIS: ICD-10-CM

## 2021-08-31 DIAGNOSIS — F32.A ANXIETY AND DEPRESSION: ICD-10-CM

## 2021-08-31 DIAGNOSIS — M48.061 BILATERAL STENOSIS OF LATERAL RECESS OF LUMBAR SPINE: ICD-10-CM

## 2021-08-31 DIAGNOSIS — F41.9 ANXIETY AND DEPRESSION: ICD-10-CM

## 2021-08-31 DIAGNOSIS — M51.37 DEGENERATION OF LUMBAR OR LUMBOSACRAL INTERVERTEBRAL DISC: ICD-10-CM

## 2021-08-31 PROCEDURE — 99213 OFFICE O/P EST LOW 20 MIN: CPT | Performed by: NURSE PRACTITIONER

## 2021-08-31 RX ORDER — GABAPENTIN 100 MG/1
CAPSULE ORAL
Qty: 90 CAPSULE | Refills: 2 | Status: SHIPPED | OUTPATIENT
Start: 2021-08-31

## 2021-09-03 ENCOUNTER — TELEPHONE (OUTPATIENT)
Dept: PAIN MEDICINE | Facility: CLINIC | Age: 51
End: 2021-09-03

## 2021-09-03 NOTE — TELEPHONE ENCOUNTER
Patient called c/o billing for her EMG/NCV for what the insurance wouldn't cover and was wanting to let Dr. Powell he should start informing patients.  I explained that Dr. Powell does not control nor price procedures or what the insurance will and will not cover but we tell all patients that because it is approved, it may not cover 100%.  Pt understood.

## 2021-09-17 ENCOUNTER — APPOINTMENT (OUTPATIENT)
Dept: MAMMOGRAPHY | Facility: HOSPITAL | Age: 51
End: 2021-09-17